# Patient Record
(demographics unavailable — no encounter records)

---

## 2021-01-05 NOTE — ED PROVIDER NOTES
Patient Seen in: [facility] AND [facility] Emergency Department      History   Patient presents with:  Eval-P    Stated Complaint:     HPI/Subjective:   HPI  51-year-old male with history of depression and bipolar disorder presenting for evaluation of hearing vo 107   Temp 97.9 °F (36.6 °C)   Resp 20   SpO2 100%         Physical Exam  Vitals signs and nursing note reviewed. Constitutional:       Appearance: He is well-developed. HENT:      Head: Normocephalic and atraumatic.    Neck:      Musculoskeletal: Margeret Milnor healthcare provider. Signs and symptoms of respiratory viral infection due to SARS-CoV-2, influenza, and RSV can be similar. Test performed using the Xpert Xpress SARS-CoV-2/FLU/RSV assay on the [address] [place] [address], [address] [place] [address].    This

## 2021-01-05 NOTE — ED INITIAL ASSESSMENT (HPI)
Patient aox3 to ed via private vehicle patient co of paranoia, stated that people are stalking him especially through videogames/computers. Patient denies si/hi, hx of bipolar, depression and anxiety. Patient seeking psych eval. Denies etoh.  Admitted to ma

## 2021-01-06 NOTE — ED NOTES
Per Zach العلي at [place], patient has been accepted to [facility] under Dr. Alfredo Soliz. Patient will be going to room 4131.  Patient can be sent to [facility] by 8 AM.

## 2021-01-06 NOTE — ED NOTES
Care assumed from [place], [facility]. Pt currently resting comfortably at this time. No distress noted. Pt is calm and cooperative. Pt states he has been feeling more paranoid lately and feels like the characters in his video games are out to get him.  Pt states he h

## 2021-01-06 NOTE — ED NOTES
Pt safe to transfer per MD. Report, paperwork, P&C, and belongings given to [place] with [facility]. Restriction of rights form placed in belongings. Pt wheeled to exit.

## 2021-01-06 NOTE — BH LEVEL OF CARE ASSESSMENT
Crisis Evaluation Assessment    Ashley Elmore YOB: 1988   Age 28year old MRN [id number]   Location [address] Attending Prabhu Wang MD      Patient's legal sex: male  Patient identifies as: male  Patient's b Non-Suicidal Self-Injury:   none  Access to Means:  Access to Means  Has access to means to attempt suicide or harm others or property: No  Discussion of Removal of Access to Means: denies SI  Access to Firearm/Weapon: No  Discussion of Removal of Alert  Type of Hallucination: Auditory  Level of Consciousness: Alert  Behavior  Exhibited behavior: Participated      Disposition:  inpatient  Assessment Summary:   Patient is a 28year old male who called 911 due to his high level of paranoia.  Patient ad

## 2021-01-06 NOTE — ED PROVIDER NOTES
Patient endorsed to me pending psychiatric transfer. No issues on my shift. Patient remains calm and cooperative per RN and still awaiting transfer.

## 2021-01-06 NOTE — ED NOTES
Nurse to nurse report given to [facility] RN of [facility] stated that a nurse will call back for accepting MD and room number, patient is most likely accepted at [facility]

## 2021-01-06 NOTE — ED NOTES
Medication list complete. Pt reports he does not take any daily medications. Does not have a preferred pharmacy.

## 2021-01-06 NOTE — ED NOTES
Spoke with pt father. Per pt, ok to speak with his father. Pt father Lucas Rod given an update on pt at this time.